# Patient Record
(demographics unavailable — no encounter records)

---

## 2017-06-28 NOTE — RAD
LEFT WRIST THREE VIEWS

6/28/17

 

A fracture of the distal radius is present with posterior angulation of the distal fragment. There i
s also a fracture of the ulnar styloid process. The carpals appear normal. The metacarpals show no a
cute change. 

 

IMPRESSION:  

Colles' fracture involving the distal radius and ulnar styloid.

 

POS: HOME

## 2017-06-28 NOTE — RAD
LEFT WRIST TWO VIEWS

6/28/17

 

Post reduction views show alignment has been restored. The angulation of the distal radius has been 
corrected. A splint has been applied. 

 

IMPRESSION:  

Post reduction of Colles' fracture. 

 

POS: HOME